# Patient Record
Sex: MALE | Race: OTHER | HISPANIC OR LATINO | Employment: FULL TIME | URBAN - METROPOLITAN AREA
[De-identification: names, ages, dates, MRNs, and addresses within clinical notes are randomized per-mention and may not be internally consistent; named-entity substitution may affect disease eponyms.]

---

## 2019-01-11 ENCOUNTER — HOSPITAL ENCOUNTER (EMERGENCY)
Facility: HOSPITAL | Age: 69
Discharge: HOME | End: 2019-01-11
Attending: EMERGENCY MEDICINE
Payer: COMMERCIAL

## 2019-01-11 ENCOUNTER — OFFICE VISIT (OUTPATIENT)
Dept: URGENT CARE | Age: 69
End: 2019-01-11
Payer: COMMERCIAL

## 2019-01-11 VITALS
HEART RATE: 112 BPM | WEIGHT: 230 LBS | HEIGHT: 73 IN | BODY MASS INDEX: 30.48 KG/M2 | DIASTOLIC BLOOD PRESSURE: 102 MMHG | SYSTOLIC BLOOD PRESSURE: 148 MMHG | RESPIRATION RATE: 16 BRPM | TEMPERATURE: 99.5 F | OXYGEN SATURATION: 96 %

## 2019-01-11 VITALS
DIASTOLIC BLOOD PRESSURE: 100 MMHG | BODY MASS INDEX: 30.48 KG/M2 | OXYGEN SATURATION: 96 % | RESPIRATION RATE: 16 BRPM | HEART RATE: 107 BPM | SYSTOLIC BLOOD PRESSURE: 140 MMHG | TEMPERATURE: 98.1 F | WEIGHT: 231 LBS

## 2019-01-11 DIAGNOSIS — R10.13 ACUTE EPIGASTRIC PAIN: Primary | ICD-10-CM

## 2019-01-11 DIAGNOSIS — R10.13 EPIGASTRIC PAIN: Primary | ICD-10-CM

## 2019-01-11 LAB
ANION GAP SERPL CALCULATED.3IONS-SCNC: 10 MMOL/L (ref 4–13)
BASOPHILS # BLD AUTO: 0.01 THOUSANDS/ΜL (ref 0–0.1)
BASOPHILS NFR BLD AUTO: 0 % (ref 0–1)
BUN SERPL-MCNC: 13 MG/DL (ref 5–25)
CALCIUM SERPL-MCNC: 9.4 MG/DL (ref 8.3–10.1)
CHLORIDE SERPL-SCNC: 100 MMOL/L (ref 100–108)
CO2 SERPL-SCNC: 26 MMOL/L (ref 21–32)
CREAT SERPL-MCNC: 0.84 MG/DL (ref 0.6–1.3)
EOSINOPHIL # BLD AUTO: 0.04 THOUSAND/ΜL (ref 0–0.61)
EOSINOPHIL NFR BLD AUTO: 1 % (ref 0–6)
ERYTHROCYTE [DISTWIDTH] IN BLOOD BY AUTOMATED COUNT: 13.2 % (ref 11.6–15.1)
GFR SERPL CREATININE-BSD FRML MDRD: 91 ML/MIN/1.73SQ M
GLUCOSE SERPL-MCNC: 121 MG/DL (ref 65–140)
HCT VFR BLD AUTO: 45.4 % (ref 36.5–49.3)
HGB BLD-MCNC: 15.1 G/DL (ref 12–17)
IMM GRANULOCYTES # BLD AUTO: 0.01 THOUSAND/UL (ref 0–0.2)
IMM GRANULOCYTES NFR BLD AUTO: 0 % (ref 0–2)
LYMPHOCYTES # BLD AUTO: 0.92 THOUSANDS/ΜL (ref 0.6–4.47)
LYMPHOCYTES NFR BLD AUTO: 13 % (ref 14–44)
MCH RBC QN AUTO: 28.1 PG (ref 26.8–34.3)
MCHC RBC AUTO-ENTMCNC: 33.3 G/DL (ref 31.4–37.4)
MCV RBC AUTO: 84 FL (ref 82–98)
MONOCYTES # BLD AUTO: 0.55 THOUSAND/ΜL (ref 0.17–1.22)
MONOCYTES NFR BLD AUTO: 8 % (ref 4–12)
NEUTROPHILS # BLD AUTO: 5.83 THOUSANDS/ΜL (ref 1.85–7.62)
NEUTS SEG NFR BLD AUTO: 78 % (ref 43–75)
NRBC BLD AUTO-RTO: 0 /100 WBCS
PLATELET # BLD AUTO: 203 THOUSANDS/UL (ref 149–390)
PMV BLD AUTO: 9.9 FL (ref 8.9–12.7)
POTASSIUM SERPL-SCNC: 3.7 MMOL/L (ref 3.5–5.3)
RBC # BLD AUTO: 5.38 MILLION/UL (ref 3.88–5.62)
SODIUM SERPL-SCNC: 136 MMOL/L (ref 136–145)
TROPONIN I SERPL-MCNC: <0.02 NG/ML
WBC # BLD AUTO: 7.36 THOUSAND/UL (ref 4.31–10.16)

## 2019-01-11 PROCEDURE — 93005 ELECTROCARDIOGRAM TRACING: CPT | Performed by: PHYSICIAN ASSISTANT

## 2019-01-11 PROCEDURE — 36415 COLL VENOUS BLD VENIPUNCTURE: CPT | Performed by: EMERGENCY MEDICINE

## 2019-01-11 PROCEDURE — 80048 BASIC METABOLIC PNL TOTAL CA: CPT | Performed by: EMERGENCY MEDICINE

## 2019-01-11 PROCEDURE — 85025 COMPLETE CBC W/AUTO DIFF WBC: CPT | Performed by: EMERGENCY MEDICINE

## 2019-01-11 PROCEDURE — 99203 OFFICE O/P NEW LOW 30 MIN: CPT | Performed by: PHYSICIAN ASSISTANT

## 2019-01-11 PROCEDURE — 84484 ASSAY OF TROPONIN QUANT: CPT | Performed by: EMERGENCY MEDICINE

## 2019-01-11 PROCEDURE — 93005 ELECTROCARDIOGRAM TRACING: CPT

## 2019-01-11 PROCEDURE — 99283 EMERGENCY DEPT VISIT LOW MDM: CPT

## 2019-01-11 RX ORDER — LISINOPRIL 20 MG/1
20 TABLET ORAL DAILY
Refills: 5 | COMMUNITY
Start: 2018-12-11

## 2019-01-11 RX ORDER — MAGNESIUM HYDROXIDE/ALUMINUM HYDROXICE/SIMETHICONE 120; 1200; 1200 MG/30ML; MG/30ML; MG/30ML
30 SUSPENSION ORAL ONCE
Status: COMPLETED | OUTPATIENT
Start: 2019-01-11 | End: 2019-01-11

## 2019-01-11 RX ORDER — SUCRALFATE 1 G/1
1 TABLET ORAL
Qty: 56 TABLET | Refills: 0 | Status: SHIPPED | OUTPATIENT
Start: 2019-01-11 | End: 2019-01-25

## 2019-01-11 RX ORDER — OMEPRAZOLE 20 MG/1
20 CAPSULE, DELAYED RELEASE ORAL DAILY
Qty: 30 CAPSULE | Refills: 0 | Status: SHIPPED | OUTPATIENT
Start: 2019-01-11

## 2019-01-11 RX ORDER — AMLODIPINE BESYLATE 5 MG/1
5 TABLET ORAL DAILY
Refills: 1 | COMMUNITY
Start: 2018-12-21

## 2019-01-11 RX ADMIN — LIDOCAINE HYDROCHLORIDE 10 ML: 20 SOLUTION ORAL; TOPICAL at 11:28

## 2019-01-11 RX ADMIN — ALUMINUM HYDROXIDE, MAGNESIUM HYDROXIDE, AND SIMETHICONE 30 ML: 200; 200; 20 SUSPENSION ORAL at 11:28

## 2019-01-11 NOTE — ED PROVIDER NOTES
History  Chief Complaint   Patient presents with    Epigastric Pain     pt c/o epigastric pain/heartburn for a while, but woke him up at 0300 and went to care now and referred to ED for further evaluation  denies n/v       80 yo male referred to the ED from [facility] for further evaluation of abdominal pain he localizes to the epigastric, onset about 3am waking him from sleep, with burning, describes it has "heartburn" and identical to previous recurrent symptoms  He denies shortness of breath, chest pain, diaphoresis   He did not take anything specific and it has been steadily improving spontaneously  BP was elevated ,but he admits he did not take his BP meds today   History provided by:  Patient  Abdominal Pain   Pain location:  Epigastric  Pain quality: burning    Pain radiates to:  Does not radiate  Onset quality:  Gradual  Duration:  8 hours  Timing:  Constant  Progression:  Improving  Chronicity:  Recurrent  Context: awakening from sleep    Relieved by:  None tried  Worsened by:  Nothing  Ineffective treatments:  None tried  Associated symptoms: no anorexia, no chest pain, no chills, no cough, no diarrhea, no dysuria, no fever, no hematemesis, no hematochezia, no hematuria, no nausea, no shortness of breath, no sore throat and no vomiting    Risk factors: no alcohol abuse, has not had multiple surgeries and no NSAID use        Prior to Admission Medications   Prescriptions Last Dose Informant Patient Reported? Taking?    amLODIPine (NORVASC) 5 mg tablet   Yes Yes   Sig: Take 5 mg by mouth daily   lisinopril (ZESTRIL) 20 mg tablet   Yes Yes   Sig: Take 20 mg by mouth daily      Facility-Administered Medications: None       Past Medical History:   Diagnosis Date    Hypertension        Past Surgical History:   Procedure Laterality Date    ELBOW SURGERY Bilateral        Family History   Problem Relation Age of Onset    Diabetes type II Family     Cancer Family      I have reviewed and agree with the history as documented  Social History   Substance Use Topics    Smoking status: Never Smoker    Smokeless tobacco: Never Used    Alcohol use Yes        Review of Systems   Constitutional: Negative for appetite change, chills and fever  HENT: Negative for sore throat  Respiratory: Negative for cough, shortness of breath and wheezing  Cardiovascular: Negative for chest pain and palpitations  Gastrointestinal: Positive for abdominal pain  Negative for anorexia, diarrhea, hematemesis, hematochezia, nausea and vomiting  Genitourinary: Negative for dysuria and hematuria  Musculoskeletal: Negative for neck pain  Skin: Negative for rash  Neurological: Negative for dizziness, weakness and headaches  Psychiatric/Behavioral: Negative for suicidal ideas  All other systems reviewed and are negative  Physical Exam  Physical Exam   Constitutional: He is oriented to person, place, and time  He appears well-developed and well-nourished  Non-toxic appearance  HENT:   Head: Normocephalic  Right Ear: Tympanic membrane and external ear normal    Left Ear: External ear normal    Nose: Nose normal    Mouth/Throat: Oropharynx is clear and moist    Eyes: Pupils are equal, round, and reactive to light  Conjunctivae, EOM and lids are normal    Neck: Normal range of motion  Neck supple  No JVD present  No Brudzinski's sign and no Kernig's sign noted  Cardiovascular: Normal rate, regular rhythm and normal heart sounds  No murmur heard  Pulmonary/Chest: Effort normal and breath sounds normal  No accessory muscle usage  No tachypnea  No respiratory distress  He has no wheezes  Abdominal: Soft  Normal appearance and bowel sounds are normal  He exhibits no distension and no mass  There is no tenderness  There is no rigidity, no rebound and no guarding  Musculoskeletal: Normal range of motion     Lymphadenopathy:        Head (right side): No submental, no submandibular, no preauricular and no posterior auricular adenopathy present  Head (left side): No submental, no submandibular, no preauricular and no posterior auricular adenopathy present  He has no cervical adenopathy  Neurological: He is alert and oriented to person, place, and time  He has normal strength and normal reflexes  No cranial nerve deficit or sensory deficit  Coordination normal  GCS eye subscore is 4  GCS verbal subscore is 5  GCS motor subscore is 6  Skin: Skin is warm and dry  No rash noted  He is not diaphoretic  Psychiatric: He has a normal mood and affect  His speech is normal and behavior is normal  Thought content normal  Cognition and memory are normal    Nursing note and vitals reviewed        Vital Signs  ED Triage Vitals [01/11/19 1104]   Temperature Pulse Respirations Blood Pressure SpO2   98 1 °F (36 7 °C) (!) 109 16 (!) 153/101 96 %      Temp Source Heart Rate Source Patient Position - Orthostatic VS BP Location FiO2 (%)   Oral -- -- -- --      Pain Score       5           Vitals:    01/11/19 1104   BP: (!) 153/101   Pulse: (!) 109       Visual Acuity      ED Medications  Medications   lidocaine viscous (XYLOCAINE) 2 % mucosal solution 10 mL (10 mL Swish & Swallow Given 1/11/19 1128)   aluminum-magnesium hydroxide-simethicone (MYLANTA) 200-200-20 mg/5 mL oral suspension 30 mL (30 mL Oral Given 1/11/19 1128)       Diagnostic Studies  Results Reviewed     Procedure Component Value Units Date/Time    Troponin I [[id number]]  (Normal) Collected:  01/11/19 1127    Lab Status:  Final result Specimen:  Blood from Arm, Right Updated:  01/11/19 1155     Troponin I <0 02 ng/mL     Basic metabolic panel [[id number]] Collected:  01/11/19 1127    Lab Status:  Final result Specimen:  Blood from Arm, Right Updated:  01/11/19 1146     Sodium 136 mmol/L      Potassium 3 7 mmol/L      Chloride 100 mmol/L      CO2 26 mmol/L      ANION GAP 10 mmol/L      BUN 13 mg/dL      Creatinine 0 84 mg/dL      Glucose 121 mg/dL      Calcium 9 4 mg/dL eGFR 91 ml/min/1 73sq m     Narrative:         National Kidney Disease Education Program recommendations are as follows:  GFR calculation is accurate only with a steady state creatinine  Chronic Kidney disease less than 60 ml/min/1 73 sq  meters  Kidney failure less than 15 ml/min/1 73 sq  meters  CBC and differential [[id number]]  (Abnormal) Collected:  01/11/19 1127    Lab Status:  Final result Specimen:  Blood from Arm, Right Updated:  01/11/19 1135     WBC 7 36 Thousand/uL      RBC 5 38 Million/uL      Hemoglobin 15 1 g/dL      Hematocrit 45 4 %      MCV 84 fL      MCH 28 1 pg      MCHC 33 3 g/dL      RDW 13 2 %      MPV 9 9 fL      Platelets 635 Thousands/uL      nRBC 0 /100 WBCs      Neutrophils Relative 78 (H) %      Immat GRANS % 0 %      Lymphocytes Relative 13 (L) %      Monocytes Relative 8 %      Eosinophils Relative 1 %      Basophils Relative 0 %      Neutrophils Absolute 5 83 Thousands/µL      Immature Grans Absolute 0 01 Thousand/uL      Lymphocytes Absolute 0 92 Thousands/µL      Monocytes Absolute 0 55 Thousand/µL      Eosinophils Absolute 0 04 Thousand/µL      Basophils Absolute 0 01 Thousands/µL                  No orders to display              Procedures  ECG 12 Lead Documentation  Date/Time: 1/11/2019 11:35 AM  Performed by: Sulema Ontiveros  Authorized by: Betty BONILLA     Rate:     ECG rate:  103  Rhythm:     Rhythm: sinus tachycardia    Ectopy:     Ectopy: none    QRS:     QRS axis:  Normal  Conduction:     Conduction: normal    ST segments:     ST segments:  Normal  T waves:     T waves: normal      Abdominal Ultrasound  Performed by: Sulema Ontiveros  Authorized by: Sulema Ontiveros     Procedure details:     Indications: abdominal pain      Assessment for:  AAA    Aorta:  Visualized  Vascular findings: Aorta: aorta normal (< 3cm)      Aorta diameter:  2 18  Ultrasound image(s) saved with chart:  Yes             Phone Contacts  ED Phone Contact    ED Course  ED Course as of Jan 11 1243   Fri Jan 11, 2019   1242 Reviewed results with patient at bedside and updated on the plan  ED results are normal   Symptoms prolonged 8 hours although steadily improving with normal troponin, so I am attributing them to GI source  No AAA by imaging  He is discharged with treatment for home and return precautions  [facility]  CritCare Time    Disposition  Final diagnoses:   Acute epigastric pain - probable gastritis     Time reflects when diagnosis was documented in both MDM as applicable and the Disposition within this note     Time User Action Codes Description Comment    1/11/2019 12:08 PM Cole BONILLA Add [R10 13] Acute epigastric pain     1/11/2019 12:08 PM Navid Lackey Modify [R10 13] Acute epigastric pain probable gastritis      ED Disposition     ED Disposition Condition Comment    Discharge  Sebatsien King discharge to home/self care  Condition at discharge: Good        Follow-up Information     Follow up With Specialties Details Why [address] [facility] Call For followup [address]  [phone]            Patient's Medications   Discharge Prescriptions    OMEPRAZOLE (PRILOSEC) 20 MG DELAYED RELEASE CAPSULE    Take 1 capsule (20 mg total) by mouth daily       Start Date: 1/11/2019 End Date: --       Order Dose: 20 mg       Quantity: 30 capsule    Refills: 0    SUCRALFATE (CARAFATE) 1 G TABLET    Take 1 tablet (1 g total) by mouth 4 (four) times a day (before meals and at bedtime) for 14 days       Start Date: 1/11/2019 End Date: 1/25/2019       Order Dose: 1 g       Quantity: 56 tablet    Refills: 0     No discharge procedures on file      ED Provider  Electronically Signed by           Varghese Mishra MD  01/11/19 2196

## 2019-01-11 NOTE — DISCHARGE INSTRUCTIONS
Gastritis  LO QUE NECESITAS SABER:  La gastritis es joselito inflamación o irritación del revestimiento del estómago  INSTRUCCIONES DE DESCARGA:  Llame al 911 por cualquiera de los siguientes:  Usted desarrolla dolor en el pecho o dificultad para respirar  Regrese al departamento de emergencia si:  Usted vomita dandy  Tienes evacuaciones negras o sangrientas  Usted tiene dolor de estómago o de espalda severo  Comuníquese con pan proveedor de atención médica si:  Tienes fiebre  Tiene síntomas nuevos o que [place], incluso después del [place]  Usted tiene preguntas o inquietudes sobre pan condición o cuidado  Medicamentos:    [place] pan medicamento según las indicaciones  Administre o prevenga la gastritis:  No fume  La nicotina y otros químicos en cigarros y puros pueden empeorar sergio síntomas y causar daño a los pulmones  Consulte a pan proveedor de atención médica para obtener información si actualmente fuma y necesita ayuda para dejar de fumar  Los cigarrillos electrónicos o el tabaco sin humo todavía contienen nicotina  Hable con pan proveedor de [employer] antes de usar estos productos  No tomes alcohol  El alcohol puede prevenir la curación y empeorar pan gastritis  Hable con pan proveedor de atención médica si necesita ayuda para dejar de beber  No tome LICO ni aspirina a menos que se lo indiquen  Estos y medicamentos similares pueden causar irritación  Si pan proveedor de [employer] que está aram lennox NSAID, tómelos con alimentos  No consuma alimentos que causen irritación  [address] naranjas y la salsa pueden causar ardor o dolor  Coma joselito variedad de alimentos saludables  Los ejemplos incluyen frutas (no cítricos), verduras, productos lácteos bajos en grasa, frijoles, panes integrales y maricruz magras y pescado  Intente comer comidas pequeñas y tome agua con sergio comidas  No coma por lo menos 3 horas antes de acostarse

## 2019-01-11 NOTE — PROGRESS NOTES
[address]        NAME: Dahlia Byers is a 79 y o  male  : 1950    MRN: [id number]  DATE: 2019  TIME: 10:46 AM    Assessment and Plan   Epigastric pain [R10 13]  1  Epigastric pain  Transfer to other facility     Patient appears clinically well, no acute distress  Patient states his pain has been improving throughout the day  Patient forgot to take blood pressure medications this morning  EKG shows tachycardia otherwise normal    Possibly gastritis due to previous history  Due to patient age, chest pain, hypertension, tachycardia, I will send to ER for further evaluation  Patient Instructions       Go immediately to the ER    Chief Complaint     Chief Complaint   Patient presents with    Chest Pain     Pt c/o chest pain and stomachache since 0300  History of Present Illness       Chest Pain    This is a new problem  The current episode started today  The onset quality is sudden  The problem has been gradually improving  The pain is present in the epigastric region  The pain is moderate  The quality of the pain is described as burning  The pain radiates to the epigastrium  Associated symptoms include abdominal pain and palpitations  Pertinent negatives include no back pain, cough, diaphoresis, dizziness, fever, headaches, irregular heartbeat, malaise/fatigue, nausea, near-syncope, numbness, orthopnea, shortness of breath, syncope, vomiting or weakness  Associated with: Patient drank juice this morning which made it worse  He has tried nothing for the symptoms  Past medical history comments: HTN, GERD       Review of Systems   Review of Systems   Constitutional: Negative for chills, diaphoresis, fatigue, fever and malaise/fatigue  HENT: Negative for congestion, postnasal drip, sinus pressure, sore throat and trouble swallowing  Eyes: Negative  Respiratory: Positive for chest tightness  Negative for cough, shortness of breath, wheezing and stridor  Cardiovascular: Positive for chest pain (epigastric radiating into chest) and palpitations  Negative for orthopnea, syncope and near-syncope  Gastrointestinal: Positive for abdominal pain  Negative for diarrhea, nausea and vomiting  Endocrine: Negative  Genitourinary: Negative for dysuria  Musculoskeletal: Negative  Negative for back pain and neck pain  Skin: Negative for pallor and rash  Allergic/Immunologic: Negative  Neurological: Negative  Negative for dizziness, weakness, light-headedness, numbness and headaches  Hematological: Negative  Psychiatric/Behavioral: Negative  Current Medications       Current Outpatient Prescriptions:     amLODIPine (NORVASC) 5 mg tablet, Take 5 mg by mouth daily, Disp: , Rfl: 1    lisinopril (ZESTRIL) 20 mg tablet, Take 20 mg by mouth daily, Disp: , Rfl: 5    Current Allergies     Allergies as of 01/11/2019    (No Known Allergies)            The following portions of the patient's history were reviewed and updated as appropriate: allergies, current medications, past family history, past medical history, past social history, past surgical history and problem list      Past Medical History:   Diagnosis Date    Hypertension        Past Surgical History:   Procedure Laterality Date    ELBOW SURGERY Bilateral        Family History   Problem Relation Age of Onset    Diabetes type II Family     Cancer Family          Medications have been verified  Objective   BP (!) 148/102 (BP Location: Right arm, Patient Position: Sitting) Comment: Manual  Pulse (!) 112 Comment: Manual  Temp 99 5 °F (37 5 °C) (Tympanic)   Resp 16   Ht 6' 1" (1 854 m)   Wt 104 kg (230 lb)   SpO2 96%   BMI 30 34 kg/m²        Physical Exam     Physical Exam   Constitutional: He is oriented to person, place, and time  He appears well-developed and well-nourished  No distress  HENT:   Head: Normocephalic and atraumatic     Right Ear: External ear normal    Left Ear: External ear normal    Nose: Nose normal    Mouth/Throat: Oropharynx is clear and moist  No oropharyngeal exudate  Eyes: Pupils are equal, round, and reactive to light  Conjunctivae and EOM are normal  Right eye exhibits no discharge  Left eye exhibits no discharge  Neck: Normal range of motion  Neck supple  Cardiovascular: Regular rhythm, S1 normal, S2 normal, normal heart sounds and intact distal pulses  Tachycardia present  Exam reveals no gallop and no friction rub  No murmur heard  Pulmonary/Chest: Effort normal and breath sounds normal  No respiratory distress  He has no wheezes  He has no rales  Abdominal: Soft  Bowel sounds are normal  He exhibits no distension and no mass  There is tenderness (epigastric)  There is no rebound and no guarding  Musculoskeletal: He exhibits no deformity  Neurological: He is alert and oriented to person, place, and time  Skin: Skin is warm  No rash noted  He is not diaphoretic  Nursing note and vitals reviewed

## 2019-01-13 LAB
ATRIAL RATE: 103 BPM
ATRIAL RATE: 107 BPM
ATRIAL RATE: 107 BPM
P AXIS: 34 DEGREES
P AXIS: 34 DEGREES
P AXIS: 54 DEGREES
PR INTERVAL: 170 MS
PR INTERVAL: 170 MS
PR INTERVAL: 174 MS
QRS AXIS: -30 DEGREES
QRS AXIS: -38 DEGREES
QRS AXIS: -38 DEGREES
QRSD INTERVAL: 88 MS
QRSD INTERVAL: 88 MS
QRSD INTERVAL: 94 MS
QT INTERVAL: 328 MS
QT INTERVAL: 328 MS
QT INTERVAL: 334 MS
QTC INTERVAL: 437 MS
T WAVE AXIS: 14 DEGREES
T WAVE AXIS: 14 DEGREES
T WAVE AXIS: 35 DEGREES
VENTRICULAR RATE: 103 BPM
VENTRICULAR RATE: 107 BPM
VENTRICULAR RATE: 107 BPM

## 2019-01-13 PROCEDURE — 93010 ELECTROCARDIOGRAM REPORT: CPT | Performed by: INTERNAL MEDICINE
